# Patient Record
Sex: FEMALE | Race: WHITE | Employment: OTHER | URBAN - METROPOLITAN AREA
[De-identification: names, ages, dates, MRNs, and addresses within clinical notes are randomized per-mention and may not be internally consistent; named-entity substitution may affect disease eponyms.]

---

## 2024-04-19 ENCOUNTER — APPOINTMENT (OUTPATIENT)
Dept: ORTHOPEDIC SURGERY | Facility: CLINIC | Age: 79
End: 2024-04-19
Payer: MEDICARE

## 2024-04-25 ENCOUNTER — HOSPITAL ENCOUNTER (OUTPATIENT)
Dept: RADIOLOGY | Facility: CLINIC | Age: 79
Discharge: HOME | End: 2024-04-25
Payer: MEDICARE

## 2024-04-25 ENCOUNTER — OFFICE VISIT (OUTPATIENT)
Dept: ORTHOPEDIC SURGERY | Facility: CLINIC | Age: 79
End: 2024-04-25
Payer: MEDICARE

## 2024-04-25 VITALS — HEIGHT: 60 IN | BODY MASS INDEX: 25.84 KG/M2 | WEIGHT: 131.6 LBS

## 2024-04-25 DIAGNOSIS — M70.61 TROCHANTERIC BURSITIS OF RIGHT HIP: ICD-10-CM

## 2024-04-25 DIAGNOSIS — M25.551 RIGHT HIP PAIN: ICD-10-CM

## 2024-04-25 PROCEDURE — 1036F TOBACCO NON-USER: CPT | Performed by: ORTHOPAEDIC SURGERY

## 2024-04-25 PROCEDURE — 73502 X-RAY EXAM HIP UNI 2-3 VIEWS: CPT | Mod: RT

## 2024-04-25 PROCEDURE — 99213 OFFICE O/P EST LOW 20 MIN: CPT | Performed by: ORTHOPAEDIC SURGERY

## 2024-04-25 PROCEDURE — 99203 OFFICE O/P NEW LOW 30 MIN: CPT | Performed by: ORTHOPAEDIC SURGERY

## 2024-04-25 PROCEDURE — 1159F MED LIST DOCD IN RCRD: CPT | Performed by: ORTHOPAEDIC SURGERY

## 2024-04-25 PROCEDURE — 73502 X-RAY EXAM HIP UNI 2-3 VIEWS: CPT | Mod: RIGHT SIDE | Performed by: RADIOLOGY

## 2024-04-25 PROCEDURE — 1125F AMNT PAIN NOTED PAIN PRSNT: CPT | Performed by: ORTHOPAEDIC SURGERY

## 2024-04-25 ASSESSMENT — PAIN DESCRIPTION - DESCRIPTORS: DESCRIPTORS: ACHING

## 2024-04-25 ASSESSMENT — PAIN - FUNCTIONAL ASSESSMENT: PAIN_FUNCTIONAL_ASSESSMENT: 0-10

## 2024-04-25 ASSESSMENT — PAIN SCALES - GENERAL: PAINLEVEL_OUTOF10: 8

## 2024-04-25 NOTE — PROGRESS NOTES
Patient is a 78-year-old female presents today for evaluation of right hip pain.  Is going on for some time now.  She had an MRI of in [place] which demonstrated the gluteus medius tear.  It is worse at night or going up stairs.  She has had physical therapy which she does feel helped.  She has not had any injections.  She does not have any groin pain.    Right hip:  AAOx3, NAD, walks with a non-antalgic gait  No pain with flexion internal rotation  Negative Stinchfield  Mild TTP over trochanter laterally  5/5 Hip flexion/knee extension/df/pf/ehl  SILT in a yina/saph/per/tib distribution  ½ dorsalis pedis and posterior tibial pulse  no popliteal or inguinal lymphadenopathy  no other overlying lesions  mood: euthymic  Respirations non labored    Plain films were reviewed by myself in clinic today.  She has well-maintained joint space with no significant signs of intra-articular pathology.    We discussed conservative treatment today in clinic.  Her exam is completely benign.  We discussed the etiology of trochanteric bursitis.  She does not require anything surgical at this time.  She can try some over-the-counter Voltaren gel.  She is given a prescription for therapy.  I do not think she warrants an injection today in clinic.  Will see how she does over time.  All of her questions were answered.    This note was created using voice recognition software and was not corrected for typographical or grammatical errors.

## 2024-05-08 ENCOUNTER — EVALUATION (OUTPATIENT)
Dept: PHYSICAL THERAPY | Facility: CLINIC | Age: 79
End: 2024-05-08
Payer: MEDICARE

## 2024-05-08 DIAGNOSIS — M70.61 TROCHANTERIC BURSITIS OF RIGHT HIP: ICD-10-CM

## 2024-05-08 DIAGNOSIS — M25.551 RIGHT HIP PAIN: ICD-10-CM

## 2024-05-08 PROCEDURE — 97161 PT EVAL LOW COMPLEX 20 MIN: CPT | Mod: GP

## 2024-05-08 PROCEDURE — 97110 THERAPEUTIC EXERCISES: CPT | Mod: GP

## 2024-05-08 ASSESSMENT — PAIN SCALES - GENERAL: PAINLEVEL_OUTOF10: 5 - MODERATE PAIN

## 2024-05-08 ASSESSMENT — ENCOUNTER SYMPTOMS
LOSS OF SENSATION IN FEET: 0
DEPRESSION: 0
OCCASIONAL FEELINGS OF UNSTEADINESS: 0

## 2024-05-08 NOTE — PROGRESS NOTES
Physical Therapy  Physical Therapy Orthopedic Evaluation    Patient Name: Corazon Owusu  MRN: [id number]  Today's Date: 5/8/2024  Time Calculation  Start Time: 1100  Stop Time: 1145  Time Calculation (min): 45 min    Insurance:  Visit number: 1  Approved number of visits: MN  Insurance: Medicare  Medicare cert date 5/8/24  to 8/1/24    General:  Reason for visit: R hip pain  Referred by: Rakesh    Current Problem  1. Right hip pain  Referral to Physical Therapy    Follow Up In Physical Therapy      2. Trochanteric bursitis of right hip  Referral to Physical Therapy    Follow Up In Physical Therapy            General  Reason for Referral: R hip pain  Referred By: Rakesh  Precautions  Precautions  Precautions Comment: none  Pain  Pain Score: 5 - Moderate pain      Subjective:   Subjective   Chief Complaint: Pt complains of R hip pain. Walking and sitting are okay, can squat but not lunge, going up steps is very difficult. Pain started over a year ago, progressed in January     Had several session of PT in [place] feels like it helped a little.  Didn't go long due to her returning back to [place] for the summer months.     Onset: 1/1/24  SURENDRA: insidious    Current Condition:   same     PAIN  increases pain/difficulty:  stairs, lunges   decrease pain:  none    Intensity (0-10): current 0, on a bad day 8, on a good day 0  Location: Lateral L hip down   Description: sharp    Relevant Information (PMH & Previous Tests/Imaging): MRI, xray   Previous Interventions/Treatments: 2 weeks of physical therapy about a month ago when in [place]    Current level of function/exercise: mat pilates 3x/weeks  Prior Level of Function (PLOF)  Patient previously independent with all ADLs    Self Reported Function (0-100%) = 90%  - 100% being back to PLOF    Work status: retired    Pt stated goal(s) decrease pain, walk up steps, do lunges    Medical History Form: Reviewed (scanned into chart)    Living situation   - house: multilevel    - lives  with    - reviewed and no concern  Personal factors Impacting care:   - language: ENG    Red Flags: Do you have any of the following? none  Fever/chills, unexplained weight changes, dizziness/fainting, unexplained change in bowel or bladder functions, unexplained malaise or muscle weakness, night pain/sweats, numbness or tingling    Problem list:  Pain, Impaired stair negotiation , Limitations to normal ADL's, and Decreased knowledge of HEP     Objective:  Objective     Hip ROM  WFL bilaterally      LE strength  Hip flexion 4+/4+  Hip abd 5/5  Hip add 5/5  Glut med 4 /4+  Glut max 4/4+  Quads 5/5  HS  5/5  DF 5/5  PF  5/5    Double leg squat: no deficits or pain  Lunge: valgus collapse R , pain    Special tests  No trendelenburg  Aníbal     Palpation: TTP IT band    Outcome Measures:  Other Measures  Lower Extremity Funtional Score (LEFS): 55     EDUCATION: home exercise program, plan of care, activity modifications, pain management, and injury pathology       HEP: Performed and provided:  Bridging  Clams  SL abd  Hip ext  Figure 4  Piriformis  Instruct in STM and rolling pin to IT band    Assessment: Patient is a 79 yo female with c/o R hip pain.   Pt presents with signs and symptoms consistent with trochanteric bursitis and IT band syndrome, resulting in limited participation in pain-free ADLs and inability to perform at their prior level of function. Pt would benefit from physical therapy to address the impairments found & listed previously in the objective section in order to return to safe and pain-free ADLs and prior level of function.       Plan:      Planned Interventions include: therapeutic exercise, self-care home management, manual therapy, therapeutic activities, gait training, neuromuscular coordination, aquatic therapy  Frequency: 1x/week  Duration: 4-6 weeks    Goals:  Active       PT Problem       STG       Start:  05/08/24    Expected End:  05/22/24       Demonstrate understanding of HEP in 2  weeks         LTG       Start:  05/08/24    Expected End:  07/07/24       MMT 5/5 B LE by 6 weeks  Reciprocal stairs with ease by 4-6 weeks  Able to perform lunges with good form and without pain by 6 weeks  LEFS improved by 10 points by 6 weeks

## 2024-05-09 ENCOUNTER — APPOINTMENT (OUTPATIENT)
Dept: ORTHOPEDIC SURGERY | Facility: CLINIC | Age: 79
End: 2024-05-09
Payer: MEDICARE

## 2024-05-21 ENCOUNTER — TREATMENT (OUTPATIENT)
Dept: PHYSICAL THERAPY | Facility: CLINIC | Age: 79
End: 2024-05-21
Payer: MEDICARE

## 2024-05-21 DIAGNOSIS — M25.551 RIGHT HIP PAIN: Primary | ICD-10-CM

## 2024-05-21 DIAGNOSIS — M70.61 TROCHANTERIC BURSITIS OF RIGHT HIP: ICD-10-CM

## 2024-05-21 PROCEDURE — 97110 THERAPEUTIC EXERCISES: CPT | Mod: GP,CQ

## 2024-05-21 ASSESSMENT — PAIN - FUNCTIONAL ASSESSMENT: PAIN_FUNCTIONAL_ASSESSMENT: 0-10

## 2024-05-21 ASSESSMENT — PAIN SCALES - GENERAL: PAINLEVEL_OUTOF10: 0 - NO PAIN

## 2024-05-21 NOTE — PROGRESS NOTES
"Physical Therapy  Physical Therapy Treatment    Patient Name: Corazon Owusu  MRN: [id number]  Today's Date: 5/21/2024  Time Calculation  Start Time: 1117  Stop Time: 1156  Time Calculation (min): 39 min    Insurance:  Visit number: 2 of [place]   Authorization info: Needs Auth  Insurance Type: Medicare  Cert date start: 5/8/24         Cert date end: 8/1/24                General   Reason for visit: R hip pain  Referred by: Rakesh    Current Problem  1. Right hip pain        2. Trochanteric bursitis of right hip            Precautions  Precautions Comment: none    Pain Assessment: 0-10  Pain Score: 0 - No pain    Subjective:   Patient reports that exercises at home have been going well that they need to perform the clamshells more often at home. Pt reports that stairs are still the greatest struggle at this time.   HEP Performed:  Yes    Objective:   Knee valgus observed while descending stairs but not while ascending stairs.     Treatments:   Recumbent bike 5'   Stairs x25 steps (at [facility])  bridges green band around the knees 2x10   Clamshell 2x10  Clamshell + yellow band 2x10  Hip abduction yellow band 2x10  Squats x20     Charges: TE x3    Assessment:   Pt tolerated treatment session well with no increase in pain aside from SL exercises in which it \"let the patient know it was there\". Pt responded well to heel drive cueing during stairs and reported having very little pain after 25 stairs in a row.     Plan:   Continue adding exercises in for posterior chain exercises.      Patrick Berman PTA  "

## 2024-05-23 ENCOUNTER — APPOINTMENT (OUTPATIENT)
Dept: PHYSICAL THERAPY | Facility: CLINIC | Age: 79
End: 2024-05-23
Payer: MEDICARE

## 2024-05-28 ENCOUNTER — TREATMENT (OUTPATIENT)
Dept: PHYSICAL THERAPY | Facility: CLINIC | Age: 79
End: 2024-05-28
Payer: MEDICARE

## 2024-05-28 DIAGNOSIS — M25.551 RIGHT HIP PAIN: Primary | ICD-10-CM

## 2024-05-28 DIAGNOSIS — M70.61 TROCHANTERIC BURSITIS OF RIGHT HIP: ICD-10-CM

## 2024-05-28 PROCEDURE — 97110 THERAPEUTIC EXERCISES: CPT | Mod: GP,CQ

## 2024-05-28 ASSESSMENT — PAIN SCALES - GENERAL: PAINLEVEL_OUTOF10: 0 - NO PAIN

## 2024-05-28 ASSESSMENT — PAIN - FUNCTIONAL ASSESSMENT: PAIN_FUNCTIONAL_ASSESSMENT: 0-10

## 2024-05-28 NOTE — PROGRESS NOTES
Physical Therapy  Physical Therapy Treatment    Patient Name: Corazon Owusu  MRN: [id number]  Today's Date: 5/28/2024  Time Calculation  Start Time: 1031  Stop Time: 1112  Time Calculation (min): 41 min    Insurance:  Visit number: 3 of [place]   Authorization info: Needs Auth  Insurance Type: Medicare  Cert date start: 5/8/24         Cert date end: 8/1/24                General   Reason for visit: R hip pain  Referred by: Rakesh    Current Problem  1. Right hip pain        2. Trochanteric bursitis of right hip            Precautions  Precautions Comment: none    Pain Assessment: 0-10  Pain Score: 0 - No pain    Subjective:   Patient reports that stairs have become much easier due to cueing for heel drive going up the stairs.     HEP Performed:  Yes    Objective:   Decreased CHARLES during ambulation.     Treatments:   Recumbent bike 5'   Calve stretch 1'   Hip flexor stretch 1' x2  Heel tap x10   Side stepping red band 3 laps   Hip abduction machine 3x10 47.5#  SL stance 1' each leg  Cone taps 4x4   Standing hip flexor stretch on chair 1'   Calve stretch one foot off of the stairs 1'     Charges: TE x2 NM x 1    Assessment:   Pt reported increase in pain during attempted heel taps on RLE due to decreased core stiffness and hip engagement.     Plan:   Continue to find non inflammatory hip extensor exercises.    Patrick Berman, PTA

## 2024-06-03 ENCOUNTER — APPOINTMENT (OUTPATIENT)
Dept: PHYSICAL THERAPY | Facility: CLINIC | Age: 79
End: 2024-06-03
Payer: MEDICARE

## 2024-06-03 ENCOUNTER — HOSPITAL ENCOUNTER (OUTPATIENT)
Dept: RADIOLOGY | Facility: EXTERNAL LOCATION | Age: 79
Discharge: HOME | End: 2024-06-03

## 2024-06-03 ENCOUNTER — HOSPITAL ENCOUNTER (OUTPATIENT)
Dept: RADIOLOGY | Facility: CLINIC | Age: 79
Discharge: HOME | End: 2024-06-03
Payer: MEDICARE

## 2024-06-03 ENCOUNTER — OFFICE VISIT (OUTPATIENT)
Dept: ORTHOPEDIC SURGERY | Facility: CLINIC | Age: 79
End: 2024-06-03
Payer: MEDICARE

## 2024-06-03 DIAGNOSIS — M54.16 RIGHT LUMBAR RADICULOPATHY: Primary | ICD-10-CM

## 2024-06-03 DIAGNOSIS — M25.551 RIGHT HIP PAIN: ICD-10-CM

## 2024-06-03 PROCEDURE — 72110 X-RAY EXAM L-2 SPINE 4/>VWS: CPT | Performed by: RADIOLOGY

## 2024-06-03 PROCEDURE — 72110 X-RAY EXAM L-2 SPINE 4/>VWS: CPT

## 2024-06-03 PROCEDURE — 99203 OFFICE O/P NEW LOW 30 MIN: CPT | Performed by: STUDENT IN AN ORGANIZED HEALTH CARE EDUCATION/TRAINING PROGRAM

## 2024-06-03 NOTE — PROGRESS NOTES
REFERRAL SOURCE: No ref. provider found     CHIEF COMPLAINT: right hip pain    HISTORY OF PRESENT ILLNESS  Corazon Owusu is a very pleasant 78 y.o. female with history of HLD who is here for evaluation of right hip pain.     6/3/24: She saw Dr. Cameron on 4/25/2024 and his note was reviewed.  Since that time, she has had 3 visits of physical therapy with some improvement of her pain.  Pain over the right lateral hip that is worse when going up stairs but not down and when getting up out of a chair.  Pain is achy.  She does get some radiation down the lateral leg, that does not go all the way to the knee and does not go into the foot.  Denies numbness and tingling.  She has had history of back pain many many years ago, but has not had any pain recently.  Denies groin pain.  She has not had injections.    MEDS  No current outpatient medications on file.    ALLERGIES  No Known Allergies    PAST MEDICAL HISTORY  No past medical history on file.    PAST SURGICAL HISTORY  No past surgical history on file.    SOCIAL HISTORY   Social History     Socioeconomic History    Marital status:      Spouse name: Not on file    Number of children: Not on file    Years of education: Not on file    Highest education level: Not on file   Occupational History    Not on file   Tobacco Use    Smoking status: Never    Smokeless tobacco: Never   Substance and Sexual Activity    Alcohol use: Not on file    Drug use: Not on file    Sexual activity: Not on file   Other Topics Concern    Not on file   Social History Narrative    Not on file     Social Determinants of Health     Financial Resource Strain: Not on file   Food Insecurity: Not on file   Transportation Needs: Not on file   Physical Activity: Not on file   Stress: Not on file   Social Connections: Not on file   Intimate Partner Violence: Not on file   Housing Stability: Not on file       FAMILY HISTORY  No family history on file.    REVIEW OF SYSTEMS  Except for those mentioned  in the history of present illness, and below, a complete review of systems is negative.     Review of Systems    VITALS  There were no vitals filed for this visit.    PHYSICAL EXAMINATION   GENERAL:  Awake, alert, and oriented, no apparent distress, pleasant, and cooperative  PSYC: Mood is euthymic, affect is congruent  EAR, NOSE, THROAT:  Normocephalic, atraumatic, moist membranes, anicteric sclera  LUNG: Nonlabored breathing  HEART: No clubbing or cyanosis  SKIN: No increased erythema, warmth, rashes, or concerning skin lesions  NEURO: Sensation is intact in the bilateral lower extremities. Strength is grossly 5 out of 5 throughout the bilateral lower extremities, unless noted below.  Negative straight leg raise.  Femoral nerve stretch test because stretching but not her typical pain.  Tight quadriceps on the right compared to the left with femoral nerve stretch test.  GAIT: Non-antalgic  MUSCULOSKELETAL: Examination of the right hip: Hip range of motion was full and pain-free. Scour's and FAIR were negative. Stinchfield was negative. Kevin's was negative. Ganeslen's and P4 are negative. No tenderness to palpation over the greater trochanteric region, vastus lateralis, ASIS, AIIS, adductor tendons, piriformis, ischial tuberosity. Aníbal's was negative.    IMAGING STUDIES:   Radiographs right hip dated 4/25/2024 were personally reviewed and interpreted by me, Dr. Mayra Cowan, and the findings shared with the patient.  Mild right hip osteoarthrosis.    MRI report from 8/7/2024 was reviewed.  There was mild right hip osteoarthritis and mild-moderate gluteus minimus tendinosis with low-grade partial-thickness tearing and moderate diffuse tendinosis of the gluteus medius tendon with prominent fluid signal defect at the distal gluteus medius articular surface consistent with moderate grade partial-thickness tear.    IMPRESSION  #1  Right hip pain, that may be related to lumbar radiculopathy versus gluteal  tendinosis/tear    PLAN  The following was discussed with the patient:     Corazon Owusu is a very pleasant 78 y.o. female with history of HLD who is here for evaluation of right hip pain.  We discussed that her symptoms are not classic.  I am not able to push on the location of pain, and we discussed that typically pain related to gluteal tendon pathology is tender to palpation.  Additionally, intra-articular hip findings on exam were negative, despite mild arthritis on MRI.  Given the weakness that she has with hip external rotation and abduction, it is possible that she has symptoms coming from the lumbar spine.  We will obtain radiographs of the lumbar spine and I will follow-up with her in 1-2 days for review.  At that time, if the x-ray does not show significant lumbar spine degenerative change or a possible cause for symptoms, we can consider injection into the gluteal tendon sheath/trochanteric bursa region for both diagnostic and therapeutic purposes.  We discussed that in the meantime she can take 1000 mg of Tylenol 3 times a day.  She can start with 500 mg 3 times a day.    The patient was counseled to remain active, but avoid activities that worsen symptoms. The patient was in agreement with this plan. All questions were answered to the best of my ability.    PATIENT EDUCATION:  Education was discussed at today's appointment. A learning needs assessment was performed.    Primary learner: Corazon Owusu  Barriers to learning: None  Preferred language: English  Learning preferences include: Seeing and doing.  Discussed: Diagnosis and treatment plan.  Demonstrated: Understanding of material discussed.  Patient education materials given: None.  Learner response: Learner demonstrated understanding.    This note was dictated using Dragon speech recognition software and was not corrected for spelling or grammatical errors.

## 2024-06-04 ENCOUNTER — TREATMENT (OUTPATIENT)
Dept: PHYSICAL THERAPY | Facility: CLINIC | Age: 79
End: 2024-06-04
Payer: MEDICARE

## 2024-06-04 DIAGNOSIS — M25.551 RIGHT HIP PAIN: Primary | ICD-10-CM

## 2024-06-04 DIAGNOSIS — M70.61 TROCHANTERIC BURSITIS OF RIGHT HIP: ICD-10-CM

## 2024-06-04 PROCEDURE — 97110 THERAPEUTIC EXERCISES: CPT | Mod: GP,CQ

## 2024-06-04 PROCEDURE — 97112 NEUROMUSCULAR REEDUCATION: CPT | Mod: GP,CQ

## 2024-06-04 ASSESSMENT — PAIN - FUNCTIONAL ASSESSMENT: PAIN_FUNCTIONAL_ASSESSMENT: 0-10

## 2024-06-04 ASSESSMENT — PAIN SCALES - GENERAL: PAINLEVEL_OUTOF10: 0 - NO PAIN

## 2024-06-04 NOTE — PROGRESS NOTES
"Physical Therapy  Physical Therapy Treatment    Patient Name: Corazon Owusu  MRN: [id number]  Today's Date: 6/4/2024  Time Calculation  Start Time: 1031  Stop Time: 1127  Time Calculation (min): 56 min    Insurance:  Visit number: 4 of [place]   Insurance Type: Medicare  Cert date start: 5/8/24         Cert date end: 8/1/24                General   Reason for visit: R hip pain  Referred by: Rakesh    Current Problem  1. Right hip pain        2. Trochanteric bursitis of right hip              Precautions  Precautions Comment: none    Pain Assessment: 0-10  Pain Score: 0 - No pain    Subjective:   Patient reports being concerned of required assistive devices in the future due to this injury but reports things are going in the right direction with therapy.     HEP Performed:  Yes    Objective:   Increased knee stability observed while performing heel taps 3 inch.     Treatments:   Recumbent bike 5'   Calve stretch 1'   Hip flexor stretch 1' x2  Heel tap 2x10   DBE 2'  BOSU ball lunges fwd 2x10  Hip abduction/adduction cybex 3x10 55#   Cone taps 4x4   MIP on airex pad 1'x2  Monster walks 6 laps red band  SL stance 1'x2   HEP changes    Charges: TE x2 NM x 2    Assessment:   Pt HEP was condensed due to them no longer being challenging. Pt reported difficulty with side lying hip abduction with 15\" holds. Pt reported no increase in pain with any exercise performed in today's session.      Plan:   Continue to progress hip abductor endurance strengthening.     Patrick Berman, PTA  "

## 2024-06-06 ENCOUNTER — OFFICE VISIT (OUTPATIENT)
Dept: ORTHOPEDIC SURGERY | Facility: CLINIC | Age: 79
End: 2024-06-06
Payer: MEDICARE

## 2024-06-06 DIAGNOSIS — M54.16 RIGHT LUMBAR RADICULOPATHY: Primary | ICD-10-CM

## 2024-06-06 PROCEDURE — 99213 OFFICE O/P EST LOW 20 MIN: CPT | Performed by: STUDENT IN AN ORGANIZED HEALTH CARE EDUCATION/TRAINING PROGRAM

## 2024-06-06 NOTE — PROGRESS NOTES
REFERRAL SOURCE: No ref. provider found     CHIEF COMPLAINT: right hip pain    HISTORY OF PRESENT ILLNESS  Corazon Owusu is a very pleasant 78 y.o. female with history of HLD who is here for evaluation of right hip pain.     Previous history:  6/3/24: She saw Dr. Cameron on 4/25/2024 and his note was reviewed.  Since that time, she has had 3 visits of physical therapy with some improvement of her pain.  Pain over the right lateral hip that is worse when going up stairs but not down and when getting up out of a chair.  Pain is achy.  She does get some radiation down the lateral leg, that does not go all the way to the knee and does not go into the foot.  Denies numbness and tingling.  She has had history of back pain many many years ago, but has not had any pain recently.  Denies groin pain.  She has not had injections.    Interval history:  6/6/24: She has noted some improvement in the severity of her pain, though the location and quality remain the same.  She is taking Tylenol 3 times a day currently, which she thinks has been helping.  She did her first session of physical therapy yesterday, which she also thinks is helpful.    MEDS  No current outpatient medications on file.    ALLERGIES  No Known Allergies    PAST MEDICAL HISTORY  No past medical history on file.    PAST SURGICAL HISTORY  No past surgical history on file.    SOCIAL HISTORY   Social History     Socioeconomic History    Marital status:      Spouse name: Not on file    Number of children: Not on file    Years of education: Not on file    Highest education level: Not on file   Occupational History    Not on file   Tobacco Use    Smoking status: Never    Smokeless tobacco: Never   Substance and Sexual Activity    Alcohol use: Not on file    Drug use: Not on file    Sexual activity: Not on file   Other Topics Concern    Not on file   Social History Narrative    Not on file     Social Determinants of Health     Financial Resource Strain: Not on  file   Food Insecurity: Not on file   Transportation Needs: Not on file   Physical Activity: Not on file   Stress: Not on file   Social Connections: Not on file   Intimate Partner Violence: Not on file   Housing Stability: Not on file       FAMILY HISTORY  No family history on file.    REVIEW OF SYSTEMS  Except for those mentioned in the history of present illness, and below, a complete review of systems is negative.     Review of Systems    VITALS  There were no vitals filed for this visit.    PHYSICAL EXAMINATION   GENERAL:  Awake, alert, and oriented, no apparent distress, pleasant, and cooperative  PSYC: Mood is euthymic, affect is congruent  EAR, NOSE, THROAT:  Normocephalic, atraumatic, moist membranes, anicteric sclera  LUNG: Nonlabored breathing  HEART: No clubbing or cyanosis  SKIN: No increased erythema, warmth, rashes, or concerning skin lesions  NEURO: Sensation is intact in the bilateral lower extremities. Strength is grossly 5 out of 5 throughout the bilateral lower extremities, unless noted below.  Negative straight leg raise. Negative femoral nerve stretch test  GAIT: Non-antalgic  MUSCULOSKELETAL: Examination of the right hip: Hip range of motion was full and pain-free. Scour's and FAIR were negative. Stinchfield was negative. Kevin's was negative. Ganeslen's and P4 are negative. No tenderness to palpation over the greater trochanteric region, vastus lateralis, ASIS, AIIS, adductor tendons, piriformis, ischial tuberosity.     IMAGING STUDIES:   Radiographs right hip dated 4/25/2024-  Mild right hip osteoarthrosis.    MRI report from 8/7/2024 - There was mild right hip osteoarthritis and mild-moderate gluteus minimus tendinosis with low-grade partial-thickness tearing and moderate diffuse tendinosis of the gluteus medius tendon with prominent fluid signal defect at the distal gluteus medius articular surface consistent with moderate grade partial-thickness tear.    Lumbar spine dated 6/3/2024 were  personally reviewed and interpreted by me, Dr. Mayra Cowan, and the findings shared with the patient.  There is moderate to severe multilevel disc degenerative change with osteophytes worse at the L1-L2 and L4-L5 and L5-S1 levels with moderate-severe facet arthrosis and mild anterior listhesis of L3 on L4.    IMPRESSION  #1  Right hip pain, it is most likely related to lumbar radiculopathy though she has gluteal tendinosis and partial tearing on MRI    PLAN  The following was discussed with the patient:     Corazon Owusu is a very pleasant 78 y.o. female with history of HLD who is here for evaluation of right hip pain.  We discussed that her symptoms are not classic.  I am not able to push on the location of pain, and we discussed that typically pain related to gluteal tendon pathology is tender to palpation.  Additionally, intra-articular hip findings on exam were negative, despite mild arthritis on MRI.  He does have radiographic evidence of multilevel lumbar degenerative disc disease, which I believe is most likely contributing to her current symptomatology.  We discussed this in detail and discussed the typical referral pattern for L5 related injury can be to the lateral hip region.  She was encouraged to continue to take Tylenol and do physical therapy.  She was given a referral to medical spine for consideration of injections if she does not continue to improve with therapy.  Follow-up with me as needed.     The patient was counseled to remain active, but avoid activities that worsen symptoms. The patient was in agreement with this plan. All questions were answered to the best of my ability.    PATIENT EDUCATION:  Education was discussed at today's appointment. A learning needs assessment was performed.    Primary learner: Corazon Owusu  Barriers to learning: None  Preferred language: English  Learning preferences include: Seeing and doing.  Discussed: Diagnosis and treatment plan.  Demonstrated: Understanding  of material discussed.  Patient education materials given: None.  Learner response: Learner demonstrated understanding.    This note was dictated using Dragon speech recognition software and was not corrected for spelling or grammatical errors.

## 2024-06-10 ENCOUNTER — TREATMENT (OUTPATIENT)
Dept: PHYSICAL THERAPY | Facility: CLINIC | Age: 79
End: 2024-06-10
Payer: MEDICARE

## 2024-06-10 DIAGNOSIS — M25.551 RIGHT HIP PAIN: ICD-10-CM

## 2024-06-10 DIAGNOSIS — M70.61 TROCHANTERIC BURSITIS OF RIGHT HIP: ICD-10-CM

## 2024-06-10 PROCEDURE — 97112 NEUROMUSCULAR REEDUCATION: CPT | Mod: GP,CQ | Performed by: SPECIALIST/TECHNOLOGIST

## 2024-06-10 PROCEDURE — 97110 THERAPEUTIC EXERCISES: CPT | Mod: GP,CQ | Performed by: SPECIALIST/TECHNOLOGIST

## 2024-06-10 ASSESSMENT — PAIN - FUNCTIONAL ASSESSMENT: PAIN_FUNCTIONAL_ASSESSMENT: 0-10

## 2024-06-10 ASSESSMENT — PAIN SCALES - GENERAL: PAINLEVEL_OUTOF10: 0 - NO PAIN

## 2024-06-10 NOTE — PROGRESS NOTES
"Physical Therapy  Physical Therapy Treatment    Patient Name: Corazon Owusu  MRN: [id number]  Today's Date: 6/10/2024  Time Calculation  Start Time: 1035  Stop Time: 1115  Time Calculation (min): 40 min    Insurance:  Visit number: 4 of [place]   Authorization info: No Auth Needed  Insurance Type: Medicare  Cert date start: 5/8/24         Cert date end: 8/1/24                General  Reason for Referral: R hip pain/bursitis  Referred By: Brooks Zafaron for visit: R hip pain  Referred by: Rakesh    Current Problem  1. Right hip pain  Follow Up In Physical Therapy      2. Trochanteric bursitis of right hip  Follow Up In Physical Therapy            Precautions  Precautions Comment: none    Pain Assessment: 0-10  Pain Score: 0 - No pain    Subjective:   Patient arrived full weight bearing without a limp noting no pain on arrival.  Patient reports quad tightness.   HEP Performed:  Yes    Objective:   No innominate rotatation  No palpable pain  Pirif 5°  Quads R 10\" L 8\"   Hip ext R 5\" L 2\"   MMT hip ext R~L 3  MMT Hams R 3+ L 4  MMT hip abd/add 5/5     Treatments:   Nu Step L3 5 min   DBE 2x2 min   4 kg KB tandem & swing cw/ccw 10x R/L   8 kg KB ISO SB R/L 1'   Bravo 7.5# iso rot R/L 1'   Hip ext R/L 44#/44# 20x  Hams 30# 20x  SS hams R/L 1'   SS R/L Pirif, Quads, HF 1'      Charges: TE x2 NM x 2    Assessment:   Patient tolerated intensity with minimal fatigue noting R soreness during R leg stabilizing L hip extension.  Patient also noted effort of core work.  Patient noted feeling good post treatment.        Plan:   Continue to improve ROM, strength, flexibility and balance to improve gait and ADL.  Add SL hams next session.  Issued General golf conditioning, stretching for golf and golf specific warm up hand outs.        Adrien Reynolds PTA  "

## 2024-06-17 ENCOUNTER — TREATMENT (OUTPATIENT)
Dept: PHYSICAL THERAPY | Facility: CLINIC | Age: 79
End: 2024-06-17
Payer: MEDICARE

## 2024-06-17 DIAGNOSIS — M25.551 RIGHT HIP PAIN: ICD-10-CM

## 2024-06-17 DIAGNOSIS — M70.61 TROCHANTERIC BURSITIS OF RIGHT HIP: ICD-10-CM

## 2024-06-17 PROCEDURE — 97140 MANUAL THERAPY 1/> REGIONS: CPT | Mod: GP

## 2024-06-17 PROCEDURE — 97112 NEUROMUSCULAR REEDUCATION: CPT | Mod: GP

## 2024-06-17 PROCEDURE — 97110 THERAPEUTIC EXERCISES: CPT | Mod: GP

## 2024-06-17 ASSESSMENT — PAIN SCALES - GENERAL: PAINLEVEL_OUTOF10: 0 - NO PAIN

## 2024-06-17 NOTE — PROGRESS NOTES
Physical Therapy Treatment    Patient Name: Corazon Owusu  MRN: [id number]  Today's Date: 6/17/2024  Time Calculation  Start Time: 1021  Stop Time: 1101  Time Calculation (min): 40 min    Insurance:     Visit [place]: [place] of [place]   Authorization info: No Auth Needed  Insurance Type: Medicare  Cert date start: 5/8/24         Cert date end: 8/1/24        Current Problem  1. Right hip pain  Follow Up In Physical Therapy      2. Trochanteric bursitis of right hip  Follow Up In Physical Therapy          General  Reason for Referral: R hip pain/bursitis  Referred By: SOFIYA Cameron MD  Precautions  Precautions  Precautions Comment: none  Pain  Pain Score: 0 - No pain    Subjective:     Patient reports hip getting better but not totally gone.   Reports pain come when standing and turning, laying on the hip, up the stairs, sitting then stand.     Compliant with HEP? yes     Objective:   Tp distal IT band and lateral quad R      Treatments:   Bike 5 min  Rolling pin to IT band  SS quad stretch 1 min x 2  Inch woms blue band 20' x 2  Tandem stance kb pass cw & ccw R/L, L/R 20 x  Leg press 70# 10 x 3, unilateral 30# 15 x B  1/2 foam rebounder toss 25 x 2 way   Bosu step up balance 20 x       Charges: man x 1 TE x 1 NM x1    Assessment: most tender distal IT band.  Challenged with balance.       Plan: work on balance and stability

## 2024-07-10 ENCOUNTER — APPOINTMENT (OUTPATIENT)
Dept: PHYSICAL THERAPY | Facility: CLINIC | Age: 79
End: 2024-07-10
Payer: MEDICARE

## 2024-07-10 ENCOUNTER — DOCUMENTATION (OUTPATIENT)
Dept: PHYSICAL THERAPY | Facility: CLINIC | Age: 79
End: 2024-07-10
Payer: MEDICARE

## 2024-07-10 DIAGNOSIS — M70.61 TROCHANTERIC BURSITIS OF RIGHT HIP: ICD-10-CM

## 2024-07-10 DIAGNOSIS — M25.551 RIGHT HIP PAIN: Primary | ICD-10-CM

## 2024-07-10 NOTE — PROGRESS NOTES
Physical Therapy                 Therapy Communication Note    Patient Name: Corazon Owusu  MRN: [id number]  Today's Date: 7/10/2024     Discipline: Physical Therapy    Missed Visit Reason:   Pt cancelled 10:00AM appointment at 9:04 AM same day due to being sick    Missed Time: Cancel    Comment:

## 2024-07-10 NOTE — PROGRESS NOTES
Physical Therapy Treatment    Patient Name: Corazon Owusu  MRN: [id number]  Today's Date: 7/10/2024       Insurance:     Visit number: 6 of [place]   Authorization info: No Auth Needed  Insurance Type: Medicare  Cert date start: 5/8/24         Cert date end: 8/1/24        Current Problem  1. Right hip pain        2. Trochanteric bursitis of right hip              General     Precautions     Pain       Subjective:     Patient reports    Compliant with HEP? yes     Objective:   Tp distal IT band and lateral quad R      Treatments:   Bike 5 min        Rolling pin to IT band  SS quad stretch 1 min x 2  Inch woms blue band 20' x 2  Tandem stance kb pass cw & ccw R/L, L/R 20 x  Leg press 70# 10 x 3, unilateral 30# 15 x B  1/2 foam rebounder toss 25 x 2 way   Bosu step up balance 20 x       Charges: man x 1 TE x 1 NM x1    Assessment:   Pt tolerated treatment session ___ requires improvement with    Plan:  Continue balance and stability training in next visit.

## 2024-07-18 ENCOUNTER — TREATMENT (OUTPATIENT)
Dept: PHYSICAL THERAPY | Facility: CLINIC | Age: 79
End: 2024-07-18
Payer: MEDICARE

## 2024-07-18 DIAGNOSIS — M70.61 TROCHANTERIC BURSITIS OF RIGHT HIP: ICD-10-CM

## 2024-07-18 DIAGNOSIS — M25.551 RIGHT HIP PAIN: ICD-10-CM

## 2024-07-18 PROCEDURE — 97110 THERAPEUTIC EXERCISES: CPT | Mod: GP

## 2024-07-18 ASSESSMENT — PAIN SCALES - GENERAL: PAINLEVEL_OUTOF10: 0 - NO PAIN

## 2024-07-18 NOTE — PROGRESS NOTES
Physical Therapy Treatment    Patient Name: Corazon Owusu  MRN: [id number]  Today's Date: 7/18/2024  Time Calculation  Start Time: 1307  Stop Time: 1355  Time Calculation (min): 48 min    Insurance:   Visit number: 6 of [place]   Authorization info: No Auth Needed  Insurance Type: Medicare  Cert date start: 5/8/24         Cert date end: 8/1/24        Current Problem  1. Right hip pain  Follow Up In Physical Therapy      2. Trochanteric bursitis of right hip  Follow Up In Physical Therapy          General  Reason for Referral: R hip pain/bursitis  Referred By: SOFIYA Cameron MD  Precautions  Precautions  Precautions Comment: none  Pain  0-10 (Numeric) Pain Score: 0 - No pain    Subjective:     Patient reports no pain currently but still pain with going up the stairs.  Feels like a dull ache.    Able to get in/out car now without issues.     Compliant with HEP?  yes    Objective:   Mild valgus collapse with sit-stand      Treatments:   Bike 5 min  Mulithip 33# 12 x 2 B 3 way  Sit-stand 12 x 4 kg , with black tband 12 x 2 4 kg  X walks black band 20' x 2  Lateral tap downs 6 inch 10 x 2 R  Toe walk/heel walk 2 x down jacobson  Leg press press 70# 12 x 3, unilateral     Charges: TE x 3    Assessment: challenged with lateral tap down, added to HEP.   Band work improved anterior leg pain with sit to stand and tap down.      Plan: see 1 more visit reassess to determine need for continued PT.

## 2024-07-25 ENCOUNTER — TREATMENT (OUTPATIENT)
Dept: PHYSICAL THERAPY | Facility: CLINIC | Age: 79
End: 2024-07-25
Payer: MEDICARE

## 2024-07-25 DIAGNOSIS — M70.61 TROCHANTERIC BURSITIS OF RIGHT HIP: ICD-10-CM

## 2024-07-25 DIAGNOSIS — M25.551 RIGHT HIP PAIN: ICD-10-CM

## 2024-07-25 PROCEDURE — 97110 THERAPEUTIC EXERCISES: CPT | Mod: GP

## 2024-07-25 PROCEDURE — 97112 NEUROMUSCULAR REEDUCATION: CPT | Mod: GP

## 2024-07-25 ASSESSMENT — PAIN SCALES - GENERAL: PAINLEVEL_OUTOF10: 6

## 2024-07-25 NOTE — PROGRESS NOTES
Physical Therapy Treatment    Patient Name: Corazon Owusu  MRN: [id number]  Today's Date: 7/25/2024  Time Calculation  Start Time: 1232  Stop Time: 1316  Time Calculation (min): 44 min    Insurance:   Visit number: 7 of [place]   Authorization info: No Auth Needed  Insurance Type: Medicare  Cert date start: 5/8/24         Cert date end: 8/1/24  Medicare recert 8/2/24 to 10/20/24    Current Problem  1. Right hip pain  Follow Up In Physical Therapy      2. Trochanteric bursitis of right hip  Follow Up In Physical Therapy          General  Reason for Referral: R hip pain/bursitis  Referred By: SOFIYA Cameron MD  Precautions  Precautions  Precautions Comment: none  Pain  0-10 (Numeric) Pain Score: 6    Subjective:     Patient reports bad day today.  Pain about 6/10 maybe she slept funny, woke up this morning with the pain.  Didn't have pain yesterday.     Compliant with HEP? yes    Objective:   LE strength  Hip flexion 5/5  Hip abd 5/5  Hip add 5/5  Glut med 4+  Quads 5/5  HS 5/5  DF 5/5  PF 5/5         Treatments:   Figure 4 1 min x 2  Rolling pin to R IT band and anterior lateral thigh  Hip flexor stretch standing 1 min   X walks black band 20' x 2   Steamboats 5# 15 x 4 way B  HSC 25# 12 x 3  Leg press 75# 12 x 3, unilateral 35# 15 x B  1/2 foam rebounder toss 25 x    Charges: Te x 2 NM x 1    Assessment: reported decreased pain after rolling pin and stretching.  Would benefit from continued PT to address remaining pain and strength deficits.       Plan: continue 1 x for 3-4 more visits.

## 2024-08-08 ENCOUNTER — TREATMENT (OUTPATIENT)
Dept: PHYSICAL THERAPY | Facility: CLINIC | Age: 79
End: 2024-08-08
Payer: MEDICARE

## 2024-08-08 DIAGNOSIS — M25.551 RIGHT HIP PAIN: Primary | ICD-10-CM

## 2024-08-08 DIAGNOSIS — M70.61 TROCHANTERIC BURSITIS OF RIGHT HIP: ICD-10-CM

## 2024-08-08 PROCEDURE — 97112 NEUROMUSCULAR REEDUCATION: CPT | Mod: GP,CQ

## 2024-08-08 PROCEDURE — 97110 THERAPEUTIC EXERCISES: CPT | Mod: GP,CQ

## 2024-08-08 ASSESSMENT — PAIN SCALES - GENERAL: PAINLEVEL_OUTOF10: 0 - NO PAIN

## 2024-08-08 NOTE — PROGRESS NOTES
Physical Therapy Treatment    Patient Name: Corazon Owusu  MRN: [id number]  Today's Date: 8/8/2024  Time Calculation  Start Time: 1300  Stop Time: 1340  Time Calculation (min): 40 min    Insurance:   Visit number: 8 of [place]   Authorization info: No Auth Needed  Insurance Type: Medicare  Cert date start: 5/8/24         Cert date end: 8/1/24  Medicare recert 8/2/24 to 10/20/24    Current Problem  1. Right hip pain  Follow Up In Physical Therapy      2. Trochanteric bursitis of right hip  Follow Up In Physical Therapy          General  Reason for Referral: R hip pain/bursitis  Referred By: SOFIYA Cameron MD  Precautions  Precautions  Precautions Comment: none  Pain  0-10 (Numeric) Pain Score: 0 - No pain    Subjective:     Patient reports having no pain at this time but is having pain going up the steps and getting out of the car.     Compliant with HEP? yes    Objective:   Decrease lumbar lordosis displayed during hip flexor stretch on step.     Treatments:   Recumbent bike 5' lvl 2.2  Hip flexor stretch with stretch strap 1'  SLR 3x10  DBE 2'x2  Stair ambulation 2x20 steps up and down  SL rebounder tosses x20   Tandem stance rebounder tosses x20 each stance  Charges: Te x 2 NM x 1    Assessment:   Pt required cueing to decrease lumbar compensation during hip flexor stretch in supine on table. Pt reported having no increase in pain aside from when performing stair ambulation but reported it decreased post hip flexor stretch.     Plan:   Continue to progress LE strengthening within pt's pain tolerance.

## 2024-08-15 ENCOUNTER — TREATMENT (OUTPATIENT)
Dept: PHYSICAL THERAPY | Facility: CLINIC | Age: 79
End: 2024-08-15
Payer: MEDICARE

## 2024-08-15 DIAGNOSIS — M70.61 TROCHANTERIC BURSITIS OF RIGHT HIP: ICD-10-CM

## 2024-08-15 DIAGNOSIS — M25.551 RIGHT HIP PAIN: ICD-10-CM

## 2024-08-15 PROCEDURE — 97110 THERAPEUTIC EXERCISES: CPT | Mod: GP,CQ

## 2024-08-15 ASSESSMENT — PAIN SCALES - GENERAL: PAINLEVEL_OUTOF10: 8

## 2024-08-15 NOTE — PROGRESS NOTES
Physical Therapy Treatment    Patient Name: Corazon Owusu  MRN: [id number]  Today's Date: 8/15/2024  Time Calculation  Start Time: 1032  Stop Time: 1112  Time Calculation (min): 40 min    Insurance:   Visit number: 9 of [place]   Authorization info: No Auth Needed  Insurance Type: Medicare  Cert date start: 5/8/24         Cert date end: 8/1/24  Medicare recert 8/2/24 to 10/20/24    Current Problem  1. Right hip pain  Follow Up In Physical Therapy      2. Trochanteric bursitis of right hip  Follow Up In Physical Therapy          General  Reason for Referral: R hip pain/bursitis  Referred By: SOFIYA Cameron MD  Precautions  Precautions  Precautions Comment: none  Pain  0-10 (Numeric) Pain Score: 8    Subjective:     Patient reports stepping in a hole while walking their dog and it going up to their mid leg. Pt reports having not hit their head and no loss of consciousness. Pt reports increase in pain is due to this.     Compliant with HEP? yes    Objective:   Minor antalgic gait during ambulation into clinic.    Treatments:   Recumbent bike 5' lvl 2.2  Gastroc incline stretch 1'   Hamstring SS stairs 1'   Quad stretch 1'  Clamshells GTB 3x10 added to HEP   Heel taps 2x8 3 inch step  Reverse clamshell's 3x10 GTB added to HEP  Charges: Te x 2 NM x 1    Assessment:   Pt reported decrease in pain post SS due to recent fall. Pt tolerated all strengthening exercises well no increase in pain and reported feeling much better than coming into their appointment today.   Plan:   Continue for 2 more appointments.

## 2024-08-20 ENCOUNTER — APPOINTMENT (OUTPATIENT)
Dept: PHYSICAL THERAPY | Facility: CLINIC | Age: 79
End: 2024-08-20
Payer: MEDICARE

## 2024-08-26 ENCOUNTER — TREATMENT (OUTPATIENT)
Dept: PHYSICAL THERAPY | Facility: CLINIC | Age: 79
End: 2024-08-26
Payer: MEDICARE

## 2024-08-26 DIAGNOSIS — M70.61 TROCHANTERIC BURSITIS OF RIGHT HIP: ICD-10-CM

## 2024-08-26 DIAGNOSIS — M25.551 RIGHT HIP PAIN: ICD-10-CM

## 2024-08-26 PROCEDURE — 97110 THERAPEUTIC EXERCISES: CPT | Mod: GP

## 2024-08-26 PROCEDURE — 97140 MANUAL THERAPY 1/> REGIONS: CPT | Mod: GP

## 2024-08-26 ASSESSMENT — PAIN SCALES - GENERAL: PAINLEVEL_OUTOF10: 7

## 2024-08-26 NOTE — PROGRESS NOTES
Physical Therapy Treatment    Patient Name: Corazon Owusu  MRN: [id number]  Today's Date: 8/26/2024  Time Calculation  Start Time: 1015  Stop Time: 1100  Time Calculation (min): 45 min    Insurance:     Visit number: 10 of [place]   Authorization info: No Auth Needed  Insurance Type: Medicare  Cert date start: 5/8/24         Cert date end: 8/1/24  Medicare recert 8/2/24 to 10/20/24    Current Problem  1. Right hip pain  Follow Up In Physical Therapy      2. Trochanteric bursitis of right hip  Follow Up In Physical Therapy          General  Reason for Referral: R hip pain/bursitis  Referred By: SOFIYA Cameron MD  Precautions  Precautions  Precautions Comment: none  Pain  0-10 (Numeric) Pain Score: 7    Subjective:     Patient reports leg had been bad the past few weeks.  Pain anterior thigh and she reports the pain going up and down stairs and in/out of car. Pain 7/10.     Compliant with HEP? yes    Objective:     LE strength  Hip flexion 5/5  Hip abd 5/5  Hip add 5/5  Hip ER   Hip IR  Glut med 5/5  Glut max 5/5  Quads 5/5  HS 5/5  DF 5/5  PF 5/5    MICK + for pain in referred location   Treatments:   Bike 5 min  Step up 20 x R  Discussed in/out car mechanics, stair navigation, etc to decrease stress/strain on hip.    Lunges in // bars 10x   Leg press 85# 12 x 3, unilateral   1/2 foam rebounder toss 25 x  STM to hip flexors 10 min R      Charges: TE x 2 man x 1    Assessment: decreased pain after treatment.  Suggested she ice her hip, perform STM and follow up with MD for pain management if needed as her strength and ROM has improved.  Also suggested different mechanics for enter/exit car and stairs in hopes to better manage without pain. No further PT warranted, to follow up with MD if pain persists.       Plan: discharge.

## 2025-05-27 ENCOUNTER — APPOINTMENT (OUTPATIENT)
Facility: CLINIC | Age: 80
End: 2025-05-27
Payer: MEDICARE

## 2025-05-27 ENCOUNTER — TELEPHONE (OUTPATIENT)
Dept: OBSTETRICS AND GYNECOLOGY | Facility: CLINIC | Age: 80
End: 2025-05-27

## 2025-05-27 VITALS
HEIGHT: 61 IN | WEIGHT: 129 LBS | DIASTOLIC BLOOD PRESSURE: 61 MMHG | SYSTOLIC BLOOD PRESSURE: 96 MMHG | BODY MASS INDEX: 24.35 KG/M2

## 2025-05-27 DIAGNOSIS — N63.25 MASS OVERLAPPING MULTIPLE QUADRANTS OF LEFT BREAST: Primary | ICD-10-CM

## 2025-05-27 PROCEDURE — 1159F MED LIST DOCD IN RCRD: CPT | Performed by: STUDENT IN AN ORGANIZED HEALTH CARE EDUCATION/TRAINING PROGRAM

## 2025-05-27 PROCEDURE — 1036F TOBACCO NON-USER: CPT | Performed by: STUDENT IN AN ORGANIZED HEALTH CARE EDUCATION/TRAINING PROGRAM

## 2025-05-27 PROCEDURE — 99204 OFFICE O/P NEW MOD 45 MIN: CPT | Performed by: STUDENT IN AN ORGANIZED HEALTH CARE EDUCATION/TRAINING PROGRAM

## 2025-05-27 PROCEDURE — 1160F RVW MEDS BY RX/DR IN RCRD: CPT | Performed by: STUDENT IN AN ORGANIZED HEALTH CARE EDUCATION/TRAINING PROGRAM

## 2025-05-27 RX ORDER — FENOFIBRIC ACID 45 MG/1
45 CAPSULE, DELAYED RELEASE ORAL
COMMUNITY
Start: 2013-10-29

## 2025-05-27 RX ORDER — LATANOPROST 50 UG/ML
SOLUTION/ DROPS OPHTHALMIC
COMMUNITY
Start: 2025-05-03

## 2025-05-27 RX ORDER — MULTIVIT-MIN/FA/LYCOPEN/LUTEIN .4-300-25
TABLET ORAL
COMMUNITY
Start: 2014-04-02

## 2025-05-27 RX ORDER — VIT C/E/ZN/COPPR/LUTEIN/ZEAXAN 250MG-90MG
CAPSULE ORAL
COMMUNITY
Start: 2014-04-02

## 2025-05-27 RX ORDER — VENLAFAXINE 37.5 MG/1
37.5 TABLET ORAL
COMMUNITY
Start: 2024-07-08 | End: 2025-07-08

## 2025-05-27 RX ORDER — CALCIUM CARBONATE/VITAMIN D3 600 MG-20
TABLET,CHEWABLE ORAL
COMMUNITY
Start: 2014-04-02

## 2025-05-27 RX ORDER — LECITHIN 1200 MG
600 CAPSULE ORAL
COMMUNITY

## 2025-05-27 RX ORDER — ROSUVASTATIN CALCIUM 10 MG/1
10 TABLET, COATED ORAL
COMMUNITY
Start: 2013-04-17 | End: 2025-07-08

## 2025-05-27 RX ORDER — TALC
POWDER (GRAM) TOPICAL
COMMUNITY

## 2025-05-27 RX ORDER — ESTRADIOL 0.1 MG/G
CREAM VAGINAL
COMMUNITY

## 2025-05-27 RX ORDER — GLUCOSAMINE/CHONDR SU A SOD 750-600 MG
TABLET ORAL
COMMUNITY
Start: 2014-04-02

## 2025-05-27 RX ORDER — GINSENG 100 MG
CAPSULE ORAL
COMMUNITY
Start: 2014-04-02

## 2025-05-27 RX ORDER — TURMERIC ROOT EXTRACT 538 MG
1 CAPSULE ORAL
COMMUNITY

## 2025-05-27 RX ORDER — ASCORBIC ACID 500 MG
TABLET ORAL
COMMUNITY
Start: 2014-04-02

## 2025-05-27 RX ORDER — PHENYLEPHRINE HCL 10 MG
500 TABLET ORAL DAILY
COMMUNITY

## 2025-05-27 RX ORDER — BRIMONIDINE TARTRATE 2 MG/ML
1 SOLUTION/ DROPS OPHTHALMIC 2 TIMES DAILY
COMMUNITY
Start: 2025-05-14

## 2025-05-27 RX ORDER — OMEGA-3-ACID ETHYL ESTERS 1 G/1
CAPSULE, LIQUID FILLED ORAL
COMMUNITY
Start: 2013-12-16

## 2025-05-27 RX ORDER — TIMOLOL MALEATE 5 MG/ML
1 SOLUTION/ DROPS OPHTHALMIC 2 TIMES DAILY
COMMUNITY

## 2025-05-27 RX ORDER — DAPAGLIFLOZIN 5 MG/1
5 TABLET, FILM COATED ORAL
COMMUNITY
Start: 2025-05-16

## 2025-05-27 RX ORDER — ICOSAPENT ETHYL 1 G/1
1 CAPSULE ORAL 2 TIMES DAILY
COMMUNITY

## 2025-05-27 NOTE — PROGRESS NOTES
Subjective   Corazon Owusu is a 79 y.o. P2 who presents today for follow up breast mass.     March 2025 found a lump while in [place]. Got MRI, mammogram, US. April 28 was last US it looks benign. Pt is here for second opinion, not sure if she fully trusts the radiology team in [place] and wondering if she should get additional imaging.    Unsure if she had breast trauma.     No h/o abnormal mammogram. Last prior was May 2024 wnl.    Recent imaging findings:  3/2025    IMPRESSION/RECOMMENDATIONS:   -0.7 cm area of nonmass enhancement at 12:00 position of the left breast   with sonographic correlate of area resembling fat necrosis.  Sonographic   findings appear benign and can consider follow-up MRI in 6 months to   ensure resolution of MRI finding if warranted.  Annual screening   mammography will be due in March 2026.       ObHx: P2   GynHx: h/o bladder sling  PMH: glaucoma, CKD  PSH: bladder sling, hysterectomy, appendectomy  FamHx:  No family history of cancer  Shx:   2children in their 50s     Objective   Physical Exam  Vitals:    05/27/25 1310   BP: 96/61      Gen: awake, alert  Head: NCAT  HEENT: moist mucus membranes  Breast: Normal R breast and axillary exam with somewhat dense breast tissue  Left breast and axillary exam is normal other than small <1cm mobile, hard mass 2 cm above nipple without overlying skin changes or nipple changes  Pulm: breathing comfortably on room air  CV: warm and well-perfused  Neuro: alert and oriented  Psych: appropriate affect     Assessment/Plan     Corazon Owusu is a 79 y.o.  who presents today for follow up breast mass.     - Images unavailable for review, discussion based on reports  - Reviewed option for repeat imaging here per pt preference, discussed would be reasonable to do FAST MRI in 6 months as recommended, but can engage in shared decision making  - Pt to let provider know    Jasmina Lorenz MD

## 2025-05-27 NOTE — TELEPHONE ENCOUNTER
Patient states shehas a [facility] on her left breast [facility] Patient wants to know if the provider recommends it be removed.

## 2025-06-02 NOTE — TELEPHONE ENCOUNTER
Called patient to notify her of the providers plan moving forward. Patient did not answwr, I left a VM. The provider states without additional pictures and imaging of the Mamm. She cannot advise on if this lump or mass needs to be removed. Once you have additional imaging you can have it faxed to us at [phone] or brought in to next appt.

## 2025-06-19 ENCOUNTER — HOSPITAL ENCOUNTER (OUTPATIENT)
Dept: RADIOLOGY | Facility: HOSPITAL | Age: 80
Discharge: HOME | End: 2025-06-19
Payer: MEDICARE

## 2025-06-19 ENCOUNTER — OFFICE VISIT (OUTPATIENT)
Dept: ORTHOPEDIC SURGERY | Facility: HOSPITAL | Age: 80
End: 2025-06-19
Payer: MEDICARE

## 2025-06-19 DIAGNOSIS — M19.032 PRIMARY OSTEOARTHRITIS OF BOTH WRISTS: ICD-10-CM

## 2025-06-19 DIAGNOSIS — M79.641 BILATERAL HAND PAIN: ICD-10-CM

## 2025-06-19 DIAGNOSIS — M79.642 BILATERAL HAND PAIN: Primary | ICD-10-CM

## 2025-06-19 DIAGNOSIS — M79.642 BILATERAL HAND PAIN: ICD-10-CM

## 2025-06-19 DIAGNOSIS — M79.641 BILATERAL HAND PAIN: Primary | ICD-10-CM

## 2025-06-19 DIAGNOSIS — M19.031 PRIMARY OSTEOARTHRITIS OF BOTH WRISTS: ICD-10-CM

## 2025-06-19 PROCEDURE — 1036F TOBACCO NON-USER: CPT | Performed by: ORTHOPAEDIC SURGERY

## 2025-06-19 PROCEDURE — G8433 SCR FOR DEP NOT CPT DOC RSN: HCPCS | Performed by: ORTHOPAEDIC SURGERY

## 2025-06-19 PROCEDURE — 99203 OFFICE O/P NEW LOW 30 MIN: CPT | Performed by: ORTHOPAEDIC SURGERY

## 2025-06-19 PROCEDURE — 73130 X-RAY EXAM OF HAND: CPT | Mod: BILATERAL PROCEDURE | Performed by: RADIOLOGY

## 2025-06-19 PROCEDURE — L3924 HFO WITHOUT JOINTS PRE OTS: HCPCS | Performed by: ORTHOPAEDIC SURGERY

## 2025-06-19 PROCEDURE — 73130 X-RAY EXAM OF HAND: CPT | Mod: 50

## 2025-06-19 PROCEDURE — 1159F MED LIST DOCD IN RCRD: CPT | Performed by: ORTHOPAEDIC SURGERY

## 2025-06-19 PROCEDURE — 1125F AMNT PAIN NOTED PAIN PRSNT: CPT | Performed by: ORTHOPAEDIC SURGERY

## 2025-06-19 PROCEDURE — 1160F RVW MEDS BY RX/DR IN RCRD: CPT | Performed by: ORTHOPAEDIC SURGERY

## 2025-06-19 ASSESSMENT — PAIN SCALES - GENERAL: PAINLEVEL_OUTOF10: 4

## 2025-06-19 ASSESSMENT — PAIN - FUNCTIONAL ASSESSMENT: PAIN_FUNCTIONAL_ASSESSMENT: 0-10

## 2025-06-20 ASSESSMENT — ENCOUNTER SYMPTOMS
JOINT SWELLING: 1
WOUND: 0
SHORTNESS OF BREATH: 0
ARTHRALGIAS: 1
FATIGUE: 0
WHEEZING: 0
CHILLS: 0
RHINORRHEA: 0
FEVER: 0
SORE THROAT: 0

## 2025-06-20 NOTE — PROGRESS NOTES
Reason for Appointment  Chief Complaint   Patient presents with    Right Wrist - Pain     & thumb    Left Wrist - Pain     & thumb     History of Present Illness  Patient is a 79 y.o. female here today who we have not seen for over 10 years for evaluation of bilateral hand pain.  Over the last few months she has had increased pain at the base of both thumbs, worse with repetitive pinching and gripping.  She has pain while using her iPad.  We have seen her in the past for the right shoulder.  X-rays taken today are reviewed and show mild to moderate bilateral CMC DJD and just some mild scattered distal digit DJD.  No recent injuries or falls, no numbness or tingling in the hands.    Medical History[1]    Surgical History[2]    Medication Documentation Review Audit       Reviewed by Monica Lynch MA (Medical Assistant) on 06/19/25 at 1031      Medication Order Taking? Sig Documenting Provider Last Dose Status   apple cider vinegar 600 mg capsule [id number] Yes Take 600 mg by mouth once daily. Historical Provider, MD  Active   ascorbic acid (Vitamin C) 500 mg tablet [id number] Yes Take by mouth. Historical Provider, MD  Active   biotin 2,500 mcg capsule [id number] Yes Take by mouth. Historical Provider, MD  Active   brimonidine (AlphaGAN) 0.2 % ophthalmic solution [id number] Yes Administer 1 drop into affected eye(s) twice a day. Historical Provider, MD  Active   calcium carbonate-vitamin D3 (Caltrate 600 plus D) 600 mg-20 mcg (800 unit) tablet,chewable [id number] Yes Chew. Historical Provider, MD  Active   cholecalciferol (Vitamin D-3) 25 mcg (1,000 units) capsule [id number] Yes Take by mouth. Historical Provider, MD  Active   choline fenofibrate (Trilipix) 45 mg DR capsule [id number] Yes Take 1 capsule (45 mg) by mouth once daily. Historical Provider, MD  Active   Cinnamon 500 mg capsule [id number] Yes Take 1 capsule (500 mg) by mouth once daily. Historical Provider, MD  Active   cranberry extract 200 mg capsule [id number]  Yes Take by mouth. Historical Provider, MD  Active   d-mannose 500 mg capsule [id number] Yes Take 1 capsule (500 mg) by mouth once daily. Historical Provider, MD  Active   estradiol (Estrace) 0.01 % (0.1 mg/gram) vaginal cream [id number] Yes INSERT VAGINALLY THREE TIMES PER WEEK. Historical Provider, MD  Active   Farxiga 5 mg tablet [id number] Yes Take 1 tablet (5 mg) by mouth once daily with breakfast. Historical Provider, MD  Active   icosapent ethyL (Vascepa) 1 gram capsule [id number] Yes Take 1 capsule (1 g) by mouth 2 times a day. Historical Provider, MD  Active   L. acidophilus/Bifid. animalis 32 billion cell capsule [id number] Yes Take by mouth. Historical Provider, MD  Active   latanoprost (Xalatan) 0.005 % ophthalmic solution [id number] Yes 1 DROP INTO BOTH EYE DAILY IN THE EVENING Historical Provider, MD  Active   magnesium oxide (Mag-Ox) 250 mg magnesium tablet [id number] Yes Take by mouth. Historical Provider, MD  Active   multivitamin with minerals iron-free (Centrum Silver) [id number] Yes Take by mouth. Historical Provider, MD  Active   omega-3 acid ethyl esters (Lovaza) 1 gram capsule [id number] Yes Take by mouth. Historical Provider, MD  Active   rosuvastatin (Crestor) 10 mg tablet [id number] Yes Take 1 tablet (10 mg) by mouth once daily. Historical Provider, MD  Active   timolol (Timoptic) 0.5 % ophthalmic solution [id number] Yes Administer 1 drop into both eyes 2 times a day. Historical Provider, MD  Active   turmeric root extract 538 mg capsule [id number] Yes Take 1 capsule by mouth once daily. Historical Provider, MD  Active   venlafaxine (Effexor) 37.5 mg tablet [id number] Yes Take 1 tablet (37.5 mg) by mouth once daily. Historical Provider, MD  Active   vitamins B1,B2,B3,B5,and B6 (B COMPLEX 100 INJ) [id number] Yes QD Historical Provider, MD  Active                    RX Allergies[3]    Review of Systems   Constitutional:  Negative for chills, fatigue and fever.   HENT:  Negative for hearing loss, rhinorrhea  and sore throat.    Respiratory:  Negative for shortness of breath and wheezing.    Cardiovascular:  Negative for chest pain and leg swelling.   Musculoskeletal:  Positive for arthralgias and joint swelling.   Skin:  Negative for rash and wound.     Exam   On exam patient is alert, awake, and in no acute distress.  Head is normocephalic, no JVD, no auditory wheezes.  She has decent shoulder motion and good elbow motion.  Minimal degenerative change in the hands, more swelling over the base of both thumbs.  Tender bilateral thumb CMC joints.  Painful CMC grind test.  Good digital motion otherwise with no triggering. Skin is warm and dry, without ulcerations, no other swelling lymphadenopathy.  Good pulses and sensation in the upper extremities.    Assessment   Bilateral wrist osteoarthritis    Plan   She is having early CMC symptoms, we discussed conservative treatment today with topical Voltaren gel and Comfort Cool braces to wear to support the thumbs with activity.  We did discuss possible steroid injections in the future if symptoms continue or worsen.  We would be happy to see her back at a point for any further issues.    I, Nicoel Grande PA-C, am acting as a scribe and attest that this documentation has been prepared under the direction and in the presence of Will Reddy MD.    By signing below, I, Will Reddy MD, personally performed the services described in this documentation. All medical record entries made by the scribe were at my direction and in my presence. I have reviewed the chart and agree that the record reflects my personal performance and is accurate and complete.               [1]   Past Medical History:  Diagnosis Date    Bladder prolapse, female, acquired     Urinary tract infection    [2]   Past Surgical History:  Procedure Laterality Date    APPENDECTOMY      BLADDER SUSPENSION  2007    HYSTERECTOMY  1992   [3] No Known Allergies